# Patient Record
Sex: FEMALE | Race: WHITE | NOT HISPANIC OR LATINO
[De-identification: names, ages, dates, MRNs, and addresses within clinical notes are randomized per-mention and may not be internally consistent; named-entity substitution may affect disease eponyms.]

---

## 2023-08-16 ENCOUNTER — DASHBOARD ENCOUNTERS (OUTPATIENT)
Age: 38
End: 2023-08-16

## 2023-08-17 ENCOUNTER — OFFICE VISIT (OUTPATIENT)
Dept: URBAN - METROPOLITAN AREA CLINIC 92 | Facility: CLINIC | Age: 38
End: 2023-08-17

## 2023-08-17 RX ORDER — ALPHA LIPOIC ACID 50 MG
TAKE 1 CAPSULE BY MOUTH EVERY DAY CAPSULE ORAL
Qty: 50 EACH | Refills: 1 | Status: ACTIVE | COMMUNITY

## 2023-08-17 RX ORDER — LEVOTHYROXINE SODIUM 175 UG/1
TAKE 1 CAPSULE BY MOUTH EVERY DAY CAPSULE ORAL
Qty: 90 EACH | Refills: 0 | Status: ACTIVE | COMMUNITY

## 2023-08-17 RX ORDER — SPIRONOLACTONE 25 MG/1
TABLET ORAL
Qty: 30 TABLET | Status: ACTIVE | COMMUNITY

## 2023-08-17 RX ORDER — AMLODIPINE BESYLATE 2.5 MG/1
TAKE 1 TABLET BY MOUTH DAILY AS DIRECTED TABLET ORAL
Qty: 30 EACH | Refills: 1 | Status: ACTIVE | COMMUNITY

## 2023-08-17 RX ORDER — LEVOTHYROXINE SODIUM 0.03 MG/1
TAKE 7 TABLETS BY MOUTH EVERY DAY TABLET ORAL
Qty: 210 EACH | Refills: 0 | Status: ACTIVE | COMMUNITY

## 2023-08-17 RX ORDER — TIZANIDINE 2 MG/1
TAKE 1 TO 2 TABLETS BY MOUTH EVERY NIGHT AT BEDTIME AS NEEDED FOR BACK PAIN TABLET ORAL
Qty: 60 EACH | Refills: 4 | Status: ACTIVE | COMMUNITY

## 2023-08-17 RX ORDER — CEFDINIR 300 MG/1
CAPSULE ORAL
Qty: 20 CAPSULE | Status: ACTIVE | COMMUNITY

## 2023-08-17 RX ORDER — IRON FUM,PS CMP/VIT C/NIACIN 125-40-3MG
TAKE 1 CAPSULE BY MOUTH 1 TIME EACH DAY CAPSULE ORAL
Qty: 30 EACH | Refills: 1 | Status: ACTIVE | COMMUNITY

## 2023-08-17 RX ORDER — AMILORIDE HYDROCLORIDE 5 MG/1
TAKE 2 TABLETS BY MOUTH EVERY MORNING THEN TAKE 1 TABLET BY MOUTH EVERY EVENING TABLET ORAL
Qty: 90 EACH | Refills: 1 | Status: ACTIVE | COMMUNITY

## 2023-08-17 RX ORDER — OLMESARTAN MEDOXOMIL 20 MG/1
TABLET, FILM COATED ORAL
Qty: 10 TABLET | Status: ACTIVE | COMMUNITY

## 2023-08-17 RX ORDER — METFORMIN ER 500 MG 500 MG/1
TAKE 2 TABLETS BY MOUTH TWICE DAILY FOR 90 DAYS TABLET ORAL
Qty: 360 EACH | Refills: 0 | Status: ACTIVE | COMMUNITY

## 2023-08-17 NOTE — HPI-TODAY'S VISIT:
This patient was referred by  __________ for an evaluation of __________.  A copy of this will be sent to the referring provider.